# Patient Record
(demographics unavailable — no encounter records)

---

## 2025-05-13 NOTE — PROCEDURE
[Right] : of the right [Knee] : knee [Pain] : pain [Inflammation] : inflammation [Alcohol] : alcohol [Ethyl Chloride sprayed topically] : ethyl chloride sprayed topically [___ cc    1%] : Lidocaine ~Vcc of 1%  [___ cc    40mg] : Methylprednisolone (Depomedrol) ~Vcc of 40 mg  [] : Patient tolerated procedure well [Call if redness, pain or fever occur] : call if redness, pain or fever occur [Apply ice for 15min out of every hour for the next 12-24 hours as tolerated] : apply ice for 15 minutes out of every hour for the next 12-24 hours as tolerated [Risks, benefits, alternatives discussed / Verbal consent obtained] : the risks benefits, and alternatives have been discussed, and verbal consent was obtained

## 2025-05-13 NOTE — PLAN
[TextEntry] : The patient was advised of the diagnosis. The natural history of the pathology was explained in full to the patient in layman's terms. All questions were answered. The risks and benefits of surgical and non-surgical treatment alternatives were explained in full to the patient.  Medication was injected into the above treated area. After verbal consent using sterile preparation and technique. The risks, benefits, and alternatives to cortisone injection were explained in full to the patient. Risks outlined include but are not limited to infection, sepsis, bleeding, scarring, skin discoloration, temporary increase in pain, syncopal episode, failure to resolve symptoms, allergic reaction, symptom recurrence, and elevation of blood sugar in diabetics. Patient understood the risks. All questions were answered. After discussion of options, patient requested an injection. Oral informed consent was obtained and sterile prep was done of the injection site. Sterile technique was utilized for the procedure including the preparation of the solutions used for the injection. Patient tolerated the procedure well. Advised to ice the injection site this evening. Prep with Betadine locally to site. Sterile technique used. Patient tolerated procedure well. Post Procedure Instructions: Patient was advised to call if redness, pain, or fever occur and apply ice for 15 min. out of every hour for the next 12-24 hours as tolerated.  The patient was instructed on the importance of ice and elevation of the extremity to decrease swelling and pain.  Will obtain an MRI of RT knee to R/O medial meniscus tear  Follow up after MRI to review results

## 2025-05-13 NOTE — PHYSICAL EXAM
[Normal Coordination] : normal coordination [Normal DTR UE/LE] : normal DTR UE/LE  [Normal Sensation] : normal sensation [Normal Mood and Affect] : normal mood and affect [Oriented] : oriented [Able to Communicate] : able to communicate [Normal Skin] : normal skin [No Rash] : no rash [No Ulcers] : no ulcers [No Lesions] : no lesions [No obvious lymphadenopathy in areas examined] : no obvious lymphadenopathy in areas examined [Well Developed] : well developed [Well Nourished] : well nourished [Peripheral vascular exam is grossly normal] : peripheral vascular exam is grossly normal [No Respiratory Distress] : no respiratory distress [Lungs clear to auscultation bilaterally] : lungs clear to auscultation bilaterally [Normal Bowel Sounds] : normal bowel sounds [Non-Tender] : non-tender [No HSM] : no HSM [No Mass] : no mass [NL (140)] : flexion 140 degrees [NL (0)] : extension 0 degrees [Right] : right knee [AP] : anteroposterior [Lateral] : lateral [There are no fractures, subluxations or dislocations. No significant abnormalities are seen] : There are no fractures, subluxations or dislocations. No significant abnormalities are seen [] : negative Lachmann

## 2025-05-13 NOTE — HISTORY OF PRESENT ILLNESS
[8] : 8 [0] : 0 [Dull/Aching] : dull/aching [Sharp] : sharp [Shooting] : shooting [Tightness] : tightness [Constant] : constant [Rest] : rest [Massage] : massage [Walking] : walking [Stairs] : stairs [Full time] : Work status: full time [Ice] : ice [Heat] : heat [de-identified] : 5/13/25  Initial visit for this 39 year old male who complains of extreme pain of RT knee for the past 2.5 months. No known trauma. Has been treated with Advil 400 mg. Incident of falling to floor due to knee giving out. Pain worsened x 1 day ago with kicking an object. Any sudden movements while sleeping causes discomfort. [] : no [FreeTextEntry1] : right knee [FreeTextEntry9] : bracing [de-identified] : getting up/ squatting [de-identified] :

## 2025-06-03 NOTE — HISTORY OF PRESENT ILLNESS
[8] : 8 [0] : 0 [Dull/Aching] : dull/aching [Sharp] : sharp [Shooting] : shooting [Tightness] : tightness [Constant] : constant [Rest] : rest [Ice] : ice [Heat] : heat [Massage] : massage [Walking] : walking [Stairs] : stairs [Full time] : Work status: full time [de-identified] : 06/03/25: Return visit for this 39 year old male, here to discuss MRI results of right knee x post more than 3 months of pain. Pt. states he has persistent pain, some days worse then others. Worse pain on driving. CSI was given two weeks ago, he feels pain on the medial aspect of the knee, anterior knee presents with no pain. He has compression sleeve that he is using and is still playing softball with persistent pain.   IMPRESSION:  1. Flap tear of the medial meniscus with free radial tearing of the body segment and flap of meniscal tissue displaced superiorly from the body segment.  2. Mild reactive medial joint line synovitis and trace subchondral marrow edema in the medial compartment.  3. Mild partial-thickness medial compartment chondral loss.  4. Small to moderate knee effusion and Yee's cyst.   5/13/25  Initial visit for this 39 year old male who complains of extreme pain of RT knee for the past 2.5 months. No known trauma. Has been treated with Advil 400 mg. Incident of falling to floor due to knee giving out. Pain worsened x 1 day ago with kicking an object. Any sudden movements while sleeping causes discomfort. [] : no [FreeTextEntry1] : right knee [FreeTextEntry9] : bracing [de-identified] : getting up/ squatting [de-identified] :

## 2025-06-03 NOTE — PLAN
[TextEntry] : The patient was advised of the diagnosis. The natural history of the pathology was explained in full to the patient in layman's terms. All questions were answered. The risks and benefits of surgical and non-surgical treatment alternatives were explained in full to the patient.  Surgical Intervention discussed with the patient, he will speak to his wife to make a decision.   The patient was referred to a sports medicine specialist for a second opinion and surgical consult.  MRI results discussed, questions answered.   Patient may weightbear and perform activity as tolerated.  Follow up as needed.